# Patient Record
Sex: FEMALE | Race: OTHER | HISPANIC OR LATINO | Employment: FULL TIME | URBAN - METROPOLITAN AREA
[De-identification: names, ages, dates, MRNs, and addresses within clinical notes are randomized per-mention and may not be internally consistent; named-entity substitution may affect disease eponyms.]

---

## 2021-07-13 RX ORDER — AMITRIPTYLINE HYDROCHLORIDE 50 MG/1
50 TABLET, FILM COATED ORAL
COMMUNITY

## 2021-07-13 RX ORDER — KRILL/OM-3/DHA/EPA/PHOSPHO/AST 500-110 MG
CAPSULE ORAL DAILY
COMMUNITY

## 2021-07-13 RX ORDER — SUMATRIPTAN 100 MG/1
100 TABLET, FILM COATED ORAL ONCE AS NEEDED
COMMUNITY

## 2021-07-13 RX ORDER — ESTRADIOL 0.1 MG/G
CREAM VAGINAL DAILY
COMMUNITY

## 2021-07-13 RX ORDER — VALACYCLOVIR HYDROCHLORIDE 500 MG/1
500 TABLET, FILM COATED ORAL 2 TIMES DAILY
COMMUNITY

## 2021-07-13 NOTE — PRE-PROCEDURE INSTRUCTIONS
Pre-Surgery Instructions:   Medication Instructions    amitriptyline (ELAVIL) 50 mg tablet Continue to take this medication on your normal schedule  If this is an oral medication and you take it in the morning, then you may take this medicine with a sip of water   Ascorbic Acid (VITAMIN C PO) stop taking 7 days prior to surgery   butalbital-acetaminophen-caffeine (FIORICET) -40 mg per tablet Continue to take this medication on your normal schedule  If this is an oral medication and you take it in the morning, then you may take this medicine with a sip of water   estradiol (ESTRACE) 0 1 mg/g vaginal cream Continue as ordered, do not apply DOS   Krill Oil (Omega-3) 500 MG CAPS stop taking 7 days prior to surgery   levothyroxine 50 mcg tablet Continue to take this medication on your normal schedule  If this is an oral medication and you take it in the morning, then you may take this medicine with a sip of water   Misc Natural Products (Turmeric Curcumin) CAPS stop taking 7 days prior to surgery   SUMAtriptan (IMITREX) 100 mg tablet Continue to take this medication on your normal schedule  If this is an oral medication and you take it in the morning, then you may take this medicine with a sip of water   valACYclovir (VALTREX) 500 mg tablet Continue to take this medication on your normal schedule  If this is an oral medication and you take it in the morning, then you may take this medicine with a sip of water  Covid screening negative as per patient  Reviewed with patient via phone all medication instructions  Advised not to take any NSAID's, Vitamins or Herbal products for 7 days prior to the DOS  Acetaminophen products are ok to take  Reviewed showering instructions as given by surgical office  Instructed about NPO after midnight the night before DOS, except sips of water with allowed medications in AM on DOS   Informed about call from Adithya Owens 26 with the time to arrive for the scheduled surgery  Patient verbalized understanding

## 2021-07-16 ENCOUNTER — ANESTHESIA EVENT (OUTPATIENT)
Dept: PERIOP | Facility: HOSPITAL | Age: 51
End: 2021-07-16
Payer: COMMERCIAL

## 2021-07-20 RX ORDER — CEFAZOLIN SODIUM 2 G/50ML
2000 SOLUTION INTRAVENOUS ONCE
Status: DISCONTINUED | OUTPATIENT
Start: 2021-07-22 | End: 2021-07-22

## 2021-07-20 RX ORDER — SODIUM CHLORIDE, SODIUM LACTATE, POTASSIUM CHLORIDE, CALCIUM CHLORIDE 600; 310; 30; 20 MG/100ML; MG/100ML; MG/100ML; MG/100ML
125 INJECTION, SOLUTION INTRAVENOUS CONTINUOUS
Status: DISCONTINUED | OUTPATIENT
Start: 2021-07-22 | End: 2021-07-22 | Stop reason: HOSPADM

## 2021-07-20 RX ORDER — PHENAZOPYRIDINE HYDROCHLORIDE 200 MG/1
200 TABLET, FILM COATED ORAL ONCE
Status: DISCONTINUED | OUTPATIENT
Start: 2021-07-22 | End: 2021-07-22

## 2021-07-22 ENCOUNTER — HOSPITAL ENCOUNTER (OUTPATIENT)
Facility: HOSPITAL | Age: 51
Setting detail: OUTPATIENT SURGERY
Discharge: HOME | End: 2021-07-22
Attending: OBSTETRICS & GYNECOLOGY | Admitting: OBSTETRICS & GYNECOLOGY
Payer: COMMERCIAL

## 2021-07-22 ENCOUNTER — ANESTHESIA (OUTPATIENT)
Dept: PERIOP | Facility: HOSPITAL | Age: 51
End: 2021-07-22
Payer: COMMERCIAL

## 2021-07-22 VITALS
HEIGHT: 63 IN | RESPIRATION RATE: 16 BRPM | WEIGHT: 175 LBS | DIASTOLIC BLOOD PRESSURE: 67 MMHG | TEMPERATURE: 97.9 F | OXYGEN SATURATION: 97 % | SYSTOLIC BLOOD PRESSURE: 125 MMHG | HEART RATE: 89 BPM | BODY MASS INDEX: 31.01 KG/M2

## 2021-07-22 DIAGNOSIS — N36.41 HYPERMOBILITY OF URETHRA: Primary | ICD-10-CM

## 2021-07-22 DIAGNOSIS — N39.3 STRESS INCONTINENCE (FEMALE) (MALE): ICD-10-CM

## 2021-07-22 PROBLEM — Z86.69 HX OF MIGRAINES: Status: ACTIVE | Noted: 2021-07-22

## 2021-07-22 LAB
EXT PREGNANCY TEST URINE: NEGATIVE
EXT. CONTROL: NORMAL

## 2021-07-22 PROCEDURE — C1771 REP DEV, URINARY, W/SLING: HCPCS | Performed by: OBSTETRICS & GYNECOLOGY

## 2021-07-22 PROCEDURE — 81025 URINE PREGNANCY TEST: CPT | Performed by: ANESTHESIOLOGY

## 2021-07-22 DEVICE — SINGLE INCISION SLING SYSTEM
Type: IMPLANTABLE DEVICE | Site: BLADDER | Status: FUNCTIONAL
Brand: ALTIS

## 2021-07-22 RX ORDER — SODIUM CHLORIDE 9 MG/ML
125 INJECTION, SOLUTION INTRAVENOUS CONTINUOUS
Status: DISCONTINUED | OUTPATIENT
Start: 2021-07-22 | End: 2021-07-22 | Stop reason: HOSPADM

## 2021-07-22 RX ORDER — FENTANYL CITRATE/PF 50 MCG/ML
25 SYRINGE (ML) INJECTION
Status: DISCONTINUED | OUTPATIENT
Start: 2021-07-22 | End: 2021-07-22 | Stop reason: HOSPADM

## 2021-07-22 RX ORDER — SENNOSIDES 8.6 MG
650 CAPSULE ORAL EVERY 8 HOURS PRN
Qty: 30 TABLET | Refills: 0
Start: 2021-07-22

## 2021-07-22 RX ORDER — PROPOFOL 10 MG/ML
INJECTION, EMULSION INTRAVENOUS AS NEEDED
Status: DISCONTINUED | OUTPATIENT
Start: 2021-07-22 | End: 2021-07-22

## 2021-07-22 RX ORDER — MIDAZOLAM HYDROCHLORIDE 2 MG/2ML
INJECTION, SOLUTION INTRAMUSCULAR; INTRAVENOUS AS NEEDED
Status: DISCONTINUED | OUTPATIENT
Start: 2021-07-22 | End: 2021-07-22

## 2021-07-22 RX ORDER — PHENAZOPYRIDINE HYDROCHLORIDE 200 MG/1
200 TABLET, FILM COATED ORAL ONCE
Status: COMPLETED | OUTPATIENT
Start: 2021-07-22 | End: 2021-07-22

## 2021-07-22 RX ORDER — ONDANSETRON 2 MG/ML
INJECTION INTRAMUSCULAR; INTRAVENOUS AS NEEDED
Status: DISCONTINUED | OUTPATIENT
Start: 2021-07-22 | End: 2021-07-22

## 2021-07-22 RX ORDER — IBUPROFEN 600 MG/1
600 TABLET ORAL EVERY 6 HOURS PRN
Status: DISCONTINUED | OUTPATIENT
Start: 2021-07-22 | End: 2021-07-22 | Stop reason: HOSPADM

## 2021-07-22 RX ORDER — IBUPROFEN 600 MG/1
600 TABLET ORAL EVERY 6 HOURS PRN
Qty: 30 TABLET | Refills: 0
Start: 2021-07-22

## 2021-07-22 RX ORDER — ACETAMINOPHEN 325 MG/1
650 TABLET ORAL EVERY 6 HOURS PRN
Status: DISCONTINUED | OUTPATIENT
Start: 2021-07-22 | End: 2021-07-22 | Stop reason: HOSPADM

## 2021-07-22 RX ORDER — ONDANSETRON 2 MG/ML
4 INJECTION INTRAMUSCULAR; INTRAVENOUS EVERY 6 HOURS PRN
Status: DISCONTINUED | OUTPATIENT
Start: 2021-07-22 | End: 2021-07-22 | Stop reason: HOSPADM

## 2021-07-22 RX ORDER — LIDOCAINE HYDROCHLORIDE 20 MG/ML
INJECTION, SOLUTION EPIDURAL; INFILTRATION; INTRACAUDAL; PERINEURAL AS NEEDED
Status: DISCONTINUED | OUTPATIENT
Start: 2021-07-22 | End: 2021-07-22

## 2021-07-22 RX ORDER — CEFAZOLIN SODIUM 2 G/50ML
2000 SOLUTION INTRAVENOUS ONCE
Status: COMPLETED | OUTPATIENT
Start: 2021-07-22 | End: 2021-07-22

## 2021-07-22 RX ORDER — PROPOFOL 10 MG/ML
INJECTION, EMULSION INTRAVENOUS CONTINUOUS PRN
Status: DISCONTINUED | OUTPATIENT
Start: 2021-07-22 | End: 2021-07-22

## 2021-07-22 RX ORDER — FENTANYL CITRATE 50 UG/ML
INJECTION, SOLUTION INTRAMUSCULAR; INTRAVENOUS AS NEEDED
Status: DISCONTINUED | OUTPATIENT
Start: 2021-07-22 | End: 2021-07-22

## 2021-07-22 RX ORDER — ALBUTEROL SULFATE 2.5 MG/3ML
2.5 SOLUTION RESPIRATORY (INHALATION) ONCE AS NEEDED
Status: DISCONTINUED | OUTPATIENT
Start: 2021-07-22 | End: 2021-07-22 | Stop reason: HOSPADM

## 2021-07-22 RX ADMIN — LIDOCAINE HYDROCHLORIDE 80 MG: 20 INJECTION, SOLUTION EPIDURAL; INFILTRATION; INTRACAUDAL; PERINEURAL at 11:33

## 2021-07-22 RX ADMIN — FENTANYL CITRATE 25 MCG: 50 INJECTION INTRAMUSCULAR; INTRAVENOUS at 11:42

## 2021-07-22 RX ADMIN — FENTANYL CITRATE 25 MCG: 50 INJECTION INTRAMUSCULAR; INTRAVENOUS at 12:18

## 2021-07-22 RX ADMIN — FENTANYL CITRATE 25 MCG: 50 INJECTION INTRAMUSCULAR; INTRAVENOUS at 11:46

## 2021-07-22 RX ADMIN — PROPOFOL 100 MCG/KG/MIN: 10 INJECTION, EMULSION INTRAVENOUS at 11:33

## 2021-07-22 RX ADMIN — SODIUM CHLORIDE 125 ML/HR: 0.9 INJECTION, SOLUTION INTRAVENOUS at 10:21

## 2021-07-22 RX ADMIN — ONDANSETRON 4 MG: 2 INJECTION INTRAMUSCULAR; INTRAVENOUS at 11:49

## 2021-07-22 RX ADMIN — PROPOFOL 40 MG: 10 INJECTION, EMULSION INTRAVENOUS at 11:33

## 2021-07-22 RX ADMIN — PHENAZOPYRIDINE 200 MG: 200 TABLET ORAL at 10:20

## 2021-07-22 RX ADMIN — CEFAZOLIN SODIUM 2000 MG: 2 SOLUTION INTRAVENOUS at 11:18

## 2021-07-22 RX ADMIN — MIDAZOLAM 2 MG: 1 INJECTION INTRAMUSCULAR; INTRAVENOUS at 11:30

## 2021-07-22 RX ADMIN — IBUPROFEN 600 MG: 600 TABLET, FILM COATED ORAL at 14:19

## 2021-07-22 RX ADMIN — FENTANYL CITRATE 25 MCG: 50 INJECTION INTRAMUSCULAR; INTRAVENOUS at 11:49

## 2021-07-22 NOTE — DISCHARGE INSTRUCTIONS
Urethral Sling Procedure   WHAT YOU NEED TO KNOW:   A bladder sling procedure is surgery to treat urinary incontinence in women  The sling acts as a hammock to keep your urethra in place and hold it closed when your bladder is full  You may have vaginal bleeding or discharge for up to a week after your surgery  Use sanitary pads  Do not use tampons  You may have some pelvic discomfort or trouble urinating  DISCHARGE INSTRUCTIONS:   Call 911 for any of the following:   · You have sudden trouble breathing  Seek care immediately if:   · Your bleeding gets worse  · You have yellow or foul smelling discharge from your vagina  · You cannot urinate, or you are urinating less than what is normal for you  · You feel confused  Contact your healthcare provider if:   · You have a fever  · You do not feel like you are able to empty your bladder completely when you urinate  · You feel the need to urinate very suddenly  · You have burning or stinging when you urinate  · You have blood in your urine  · Your skin is itchy, swollen, or you have a rash  · You have questions or concerns about your condition or care  Medicines:   · Prescription pain medicine  may be given  Ask your how to take this medicine safely  · Take your medicine as directed  Contact your healthcare provider if you think your medicine is not helping or if you have side effects  Tell him or her if you are allergic to any medicine  Keep a list of the medicines, vitamins, and herbs you take  Include the amounts, and when and why you take them  Bring the list or the pill bottles to follow-up visits  Carry your medicine list with you in case of an emergency  Self-catheterization:  You may need to put a catheter into your bladder after you urinate to empty any remaining urine  A catheter is a small rubber tube used to drain urine  Healthcare providers will teach you how to put the catheter in safely   This may be needed until you are completely emptying your bladder  Meyer catheter: You may have a Meyer catheter for a short period of time  The Meyer is a tube put into your bladder to drain urine into a bag  Keep the bag below your waist  This will prevent urine from flowing back into your bladder and causing an infection or other problems  Also, keep the tube free of kinks so the urine will drain properly  Do not pull on the catheter  This can cause pain and bleeding, and may cause the catheter to come out  Activity:  Do not lift heavy objects for 6 weeks after your procedure  Do not have intercourse for 4 to 6 weeks  Do not use a tampon for 4 weeks  Ask your healthcare provider when you can return to work or your usual activities  Do pelvic muscle exercises: These are also called Kegel exercises  These exercises help strengthen your pelvic muscles and help prevent urine leakage  Tighten the muscles of your pelvis and hold them tight for 5 seconds, then relax for 5 seconds  Gradually work up to tightening them for 10 seconds and relaxing for 10 seconds  Do this 3 times each day  Keep a record:  Keep a record of when you urinate and if you leak any urine  Write down what you were doing when you leaked urine, such as coughing or sneezing  Bring the log to your follow-up visits  Prevent constipation:  Drink liquids as directed  You may need to drink more water than usual to soften your bowel movements  Eat a variety of healthy foods, especially fruit and foods high in fiber  You may need to use an over-the-counter bowel movement softener  Follow up with your healthcare provider as directed: You may need a test to check how much urine remains in your bladder after you urinate  This will help show how the sling is working  Write down your questions so you remember to ask them during your visits    © 2017 [employer] Information is for End User's use only and may not be sold, redistributed or otherwise used for commercial purposes  All illustrations and images included in CareNotes® are the copyrighted property of A D A M , Inc  or [employer]  The above information is an  only  It is not intended as medical advice for individual conditions or treatments  Talk to your doctor, nurse or pharmacist before following any medical regimen to see if it is safe and effective for you

## 2021-07-22 NOTE — OP NOTE
OPERATIVE REPORT  PATIENT NAME: Lissy Chavez    :  1970  MRN: [id number]  Pt Location: [facility] OR ROOM 07    SURGERY DATE: 2021    Surgeon(s) and Role:     * Yamilka Gaines MD - Primary     * Elin Tinajero MD - Fellow Assisting    Preop Diagnosis:  Stress incontinence (female) (male) [N39 3]  Hypermobility of urethra [N36 41]    Post-Op Diagnosis Codes:     * Stress incontinence (female) (male) [N39 3]     * Hypermobility of urethra [N36 41]    Procedure:  1 - Pubovaginal sling (ALTIS)  2 - Cysto    Specimen(s):  * No specimens in log *    Estimated Blood Loss:   50 mL    Drains:  * No LDAs found *    Anesthesia Type:   IV Sedation with Anesthesia    Operative Findings:  Cystoscopy at the end of the procedure showed normal appearing urothelium with no suture, mesh, or other foreign body  Bilateral ureteral jets were seen  Complications:   None    Procedure and Technique:    The patient was taken to the operating room where MAC anesthesia was found to be adequate  She was positioned in lithotomy using Yellowfin stirrups  SCDs were placed on her legs for DVT prophylaxis and antibiotics were administered for surgical site infection prophylaxis  The vagina and perineum were sterilely prepped and the patient was sterilely draped  A time out was called, identifying the correct patient and procedure  Next, attention was turned to the single incision pubovaginal sling (using the ALTIS system)  A samayoa catheter was inserted into the bladder  Hydrodissection of the vaginal wall beneath the mid urethra and vaginal sulci was performed  A 2 cm midurethral incision was made and periurethral tunnels were created towards the obturator membranes at 2 and 10 o'clock  The sling trocar was inserted into the fixation stay of the mesh and the static end of the mesh was placed into the left periurethral tunnel with the bladder protected and the tip of the trocar against the  complex    The trocar was first pushed cephalad, then advanced laterally until a pop was appreciated, then the trocar was rotated 1/4 turn, then withdrawn, leaving the stay in place  This same procedure was repeated on the patient's right side with the adjustable end of the sling  Cystoscopy was performed  Brisk efflux was noted from both ureters  The urothelium appeared normal with no lesions, suture, sling, or other foreign body  The patient was asked to cough and the sling was then tightened until minimal urine was seen leaking from the urethra  The adjustment suture was cut  The vaginal incision was closed with 2-0 Vicryl suture  The Meyer catheter was reinserted  The patient tolerated the procedure well  All counts were correct x2  The patient was taken back to the recovery room in stable condition  A qualified resident was not available for the case  I spoke to the patient's family at the end of the procedure     I was present for the entire procedure    Patient Disposition:  PACU     SIGNATURE: Vega Akbar MD  DATE: July 22, 2021  TIME: 12:41 PM

## 2021-07-22 NOTE — ANESTHESIA PREPROCEDURE EVALUATION
Procedure:  PUBOVAGINAL SLING (N/A Vagina )  CYSTOSCOPY (N/A Bladder)    Relevant Problems   No relevant active problems        Physical Exam    Airway    Mallampati score: II  TM Distance: >3 FB  Neck ROM: full     Dental   No notable dental hx     Cardiovascular  Rhythm: regular, Rate: normal, Cardiovascular exam normal    Pulmonary  Pulmonary exam normal Breath sounds clear to auscultation,     Other Findings        Anesthesia Plan  ASA Score- 2     Anesthesia Type- IV sedation with anesthesia with ASA Monitors  Additional Monitors:   Airway Plan:           Plan Factors-    Existing labs reviewed  Patient summary reviewed  Patient is not a current smoker  Patient instructed to abstain from smoking on day of procedure  Patient did not smoke on day of surgery  Induction- intravenous  Postoperative Plan-     Informed Consent- Anesthetic plan and risks discussed with patient and spouse (Kavitha Luis)

## 2024-09-18 ENCOUNTER — HOSPITAL ENCOUNTER (EMERGENCY)
Facility: HOSPITAL | Age: 54
Discharge: HOME | End: 2024-09-18
Attending: STUDENT IN AN ORGANIZED HEALTH CARE EDUCATION/TRAINING PROGRAM
Payer: OTHER MISCELLANEOUS

## 2024-09-18 ENCOUNTER — APPOINTMENT (EMERGENCY)
Dept: CT IMAGING | Facility: HOSPITAL | Age: 54
End: 2024-09-18
Payer: OTHER MISCELLANEOUS

## 2024-09-18 VITALS
DIASTOLIC BLOOD PRESSURE: 71 MMHG | SYSTOLIC BLOOD PRESSURE: 160 MMHG | HEART RATE: 78 BPM | TEMPERATURE: 97.7 F | OXYGEN SATURATION: 99 % | RESPIRATION RATE: 20 BRPM

## 2024-09-18 DIAGNOSIS — M54.9 BACK PAIN: Primary | ICD-10-CM

## 2024-09-18 DIAGNOSIS — M54.2 NECK PAIN: ICD-10-CM

## 2024-09-18 DIAGNOSIS — R93.89 ABNORMAL CT SCAN: ICD-10-CM

## 2024-09-18 DIAGNOSIS — R07.9 CHEST PAIN: ICD-10-CM

## 2024-09-18 LAB
ALBUMIN SERPL BCG-MCNC: 4.2 G/DL (ref 3.5–5)
ALP SERPL-CCNC: 44 U/L (ref 34–104)
ALT SERPL W P-5'-P-CCNC: 11 U/L (ref 7–52)
ANION GAP SERPL CALCULATED.3IONS-SCNC: 7 MMOL/L (ref 4–13)
AST SERPL W P-5'-P-CCNC: 12 U/L (ref 13–39)
ATRIAL RATE: 65 BPM
BASOPHILS # BLD AUTO: 0.08 THOUSANDS/ΜL (ref 0–0.1)
BASOPHILS NFR BLD AUTO: 1 % (ref 0–1)
BILIRUB SERPL-MCNC: 1.12 MG/DL (ref 0.2–1)
BUN SERPL-MCNC: 17 MG/DL (ref 5–25)
CALCIUM SERPL-MCNC: 9 MG/DL (ref 8.4–10.2)
CARDIAC TROPONIN I PNL SERPL HS: <2 NG/L
CHLORIDE SERPL-SCNC: 110 MMOL/L (ref 96–108)
CO2 SERPL-SCNC: 24 MMOL/L (ref 21–32)
CREAT SERPL-MCNC: 0.77 MG/DL (ref 0.6–1.3)
EOSINOPHIL # BLD AUTO: 0.36 THOUSAND/ΜL (ref 0–0.61)
EOSINOPHIL NFR BLD AUTO: 4 % (ref 0–6)
ERYTHROCYTE [DISTWIDTH] IN BLOOD BY AUTOMATED COUNT: 13.9 % (ref 11.6–15.1)
GFR SERPL CREATININE-BSD FRML MDRD: 87 ML/MIN/1.73SQ M
GLUCOSE SERPL-MCNC: 91 MG/DL (ref 65–140)
HCT VFR BLD AUTO: 38.1 % (ref 34.8–46.1)
HGB BLD-MCNC: 12.7 G/DL (ref 11.5–15.4)
IMM GRANULOCYTES # BLD AUTO: 0.04 THOUSAND/UL (ref 0–0.2)
IMM GRANULOCYTES NFR BLD AUTO: 0 % (ref 0–2)
LYMPHOCYTES # BLD AUTO: 2.03 THOUSANDS/ΜL (ref 0.6–4.47)
LYMPHOCYTES NFR BLD AUTO: 21 % (ref 14–44)
MCH RBC QN AUTO: 28.9 PG (ref 26.8–34.3)
MCHC RBC AUTO-ENTMCNC: 33.3 G/DL (ref 31.4–37.4)
MCV RBC AUTO: 87 FL (ref 82–98)
MONOCYTES # BLD AUTO: 0.55 THOUSAND/ΜL (ref 0.17–1.22)
MONOCYTES NFR BLD AUTO: 6 % (ref 4–12)
NEUTROPHILS # BLD AUTO: 6.82 THOUSANDS/ΜL (ref 1.85–7.62)
NEUTS SEG NFR BLD AUTO: 68 % (ref 43–75)
NRBC BLD AUTO-RTO: 0 /100 WBCS
P AXIS: 47 DEGREES
PLATELET # BLD AUTO: 286 THOUSANDS/UL (ref 149–390)
PMV BLD AUTO: 10.4 FL (ref 8.9–12.7)
POTASSIUM SERPL-SCNC: 3.7 MMOL/L (ref 3.5–5.3)
PR INTERVAL: 170 MS
PROT SERPL-MCNC: 6.3 G/DL (ref 6.4–8.4)
QRS AXIS: 55 DEGREES
QRSD INTERVAL: 74 MS
QT INTERVAL: 440 MS
QTC INTERVAL: 457 MS
RBC # BLD AUTO: 4.39 MILLION/UL (ref 3.81–5.12)
SODIUM SERPL-SCNC: 141 MMOL/L (ref 135–147)
T WAVE AXIS: 58 DEGREES
VENTRICULAR RATE: 65 BPM
WBC # BLD AUTO: 9.88 THOUSAND/UL (ref 4.31–10.16)

## 2024-09-18 PROCEDURE — 74177 CT ABD & PELVIS W/CONTRAST: CPT

## 2024-09-18 PROCEDURE — 93005 ELECTROCARDIOGRAM TRACING: CPT

## 2024-09-18 PROCEDURE — 99284 EMERGENCY DEPT VISIT MOD MDM: CPT

## 2024-09-18 PROCEDURE — 36415 COLL VENOUS BLD VENIPUNCTURE: CPT | Performed by: STUDENT IN AN ORGANIZED HEALTH CARE EDUCATION/TRAINING PROGRAM

## 2024-09-18 PROCEDURE — 84484 ASSAY OF TROPONIN QUANT: CPT | Performed by: STUDENT IN AN ORGANIZED HEALTH CARE EDUCATION/TRAINING PROGRAM

## 2024-09-18 PROCEDURE — 93010 ELECTROCARDIOGRAM REPORT: CPT | Performed by: INTERNAL MEDICINE

## 2024-09-18 PROCEDURE — 80053 COMPREHEN METABOLIC PANEL: CPT | Performed by: STUDENT IN AN ORGANIZED HEALTH CARE EDUCATION/TRAINING PROGRAM

## 2024-09-18 PROCEDURE — 71260 CT THORAX DX C+: CPT

## 2024-09-18 PROCEDURE — 96374 THER/PROPH/DIAG INJ IV PUSH: CPT

## 2024-09-18 PROCEDURE — 99284 EMERGENCY DEPT VISIT MOD MDM: CPT | Performed by: STUDENT IN AN ORGANIZED HEALTH CARE EDUCATION/TRAINING PROGRAM

## 2024-09-18 PROCEDURE — 72125 CT NECK SPINE W/O DYE: CPT

## 2024-09-18 PROCEDURE — 85025 COMPLETE CBC W/AUTO DIFF WBC: CPT | Performed by: STUDENT IN AN ORGANIZED HEALTH CARE EDUCATION/TRAINING PROGRAM

## 2024-09-18 RX ORDER — OXYCODONE HYDROCHLORIDE 5 MG/1
5 TABLET ORAL ONCE
Status: COMPLETED | OUTPATIENT
Start: 2024-09-18 | End: 2024-09-18

## 2024-09-18 RX ORDER — CYCLOBENZAPRINE HCL 10 MG
10 TABLET ORAL 2 TIMES DAILY PRN
Qty: 20 TABLET | Refills: 0 | Status: SHIPPED | OUTPATIENT
Start: 2024-09-18

## 2024-09-18 RX ORDER — KETOROLAC TROMETHAMINE 30 MG/ML
30 INJECTION, SOLUTION INTRAMUSCULAR; INTRAVENOUS ONCE
Status: COMPLETED | OUTPATIENT
Start: 2024-09-18 | End: 2024-09-18

## 2024-09-18 RX ADMIN — KETOROLAC TROMETHAMINE 30 MG: 30 INJECTION, SOLUTION INTRAMUSCULAR at 15:26

## 2024-09-18 RX ADMIN — OXYCODONE HYDROCHLORIDE 5 MG: 5 TABLET ORAL at 16:39

## 2024-09-18 RX ADMIN — IOHEXOL 100 ML: 350 INJECTION, SOLUTION INTRAVENOUS at 15:18

## 2024-09-18 NOTE — DISCHARGE INSTRUCTIONS
Continues over-the-counter medication like Tylenol Motrin as needed for discomfort.  You were given a muscle relaxer you can take at home. You can also try heating pad.  You can place Lidoderm patches on your back or areas of discomfort.    Follow-up with primary care physician as needed.    Return for any new or worsening symptoms.

## 2024-09-18 NOTE — Clinical Note
Renee Terrazas was seen and treated in our emergency department on 9/18/2024.    No restrictions            Diagnosis:     Renee  may return to work on return date.    She may return on this date: 09/20/2024         If you have any questions or concerns, please don't hesitate to call.      Carla Champion, DO    ______________________________           _______________          _______________  Hospital Representative                              Date                                Time

## 2024-09-18 NOTE — ED PROVIDER NOTES
1. Back pain    2. Neck pain    3. Chest pain    4. Abnormal CT scan      ED Disposition       ED Disposition   Discharge    Condition   Stable    Date/Time   Wed Sep 18, 2024  4:27 PM    Comment   Renee Terrazas discharge to home/self care.                   Assessment & Plan       Medical Decision Making  Amount and/or Complexity of Data Reviewed  Labs: ordered.  Radiology: ordered.    Risk  Prescription drug management.      Differential muscle spasm, lumbar radiculopathy, compression fracture    Patient is a 53-year-old female present emerged department no acute respiratory distress and vital signs unremarkable.  Patient does not have any deficits on physical exam.  Patient is concerned for internal injury.  Discomfort is in both the thoracic and lumbar region.  Nothing is made it better at this point.  Came in for further management.  Lab work nonacute.  CT performed and incidental findings reported to patient at bedside.  Discussed symptom management as well as return follow-up precautions.  Disposition discharge    HEART Risk Score      Flowsheet Row Most Recent Value   Heart Score Risk Calculator    History 0 Filed at: 09/18/2024 1627   ECG 0 Filed at: 09/18/2024 1627   Age 1 Filed at: 09/18/2024 1627   Risk Factors 1 Filed at: 09/18/2024 1627   Troponin 0 Filed at: 09/18/2024 1627   HEART Score 2 Filed at: 09/18/2024 1627        EKG: rate 65 NSR without signs of acute ischemic change. No prior ekgs.            Medications   oxyCODONE (ROXICODONE) IR tablet 5 mg (5 mg Oral Given 9/18/24 1639)   iohexol (OMNIPAQUE) 350 MG/ML injection (MULTI-DOSE) 100 mL (100 mL Intravenous Given 9/18/24 1518)   ketorolac (TORADOL) injection 30 mg (30 mg Intravenous Given 9/18/24 1526)       History of Present Illness       HPI    Patient is a 53-year-old female present emerged department for a mechanical fall yesterday.  Patient says she fell down after walking up a set of stairs.  She landed on her backside.  She did not  strike her head or lose consciousness.  She is not on any blood thinning medications.  Patient has pain throughout her entire spine and into her neck.  Patient also reports substernal pain with radiation to her shoulders.  Thing seems to make her symptoms better or worse.  History includes high blood pressure and hypothyroidism.    Review of Systems   Constitutional:  Negative for chills and fever.   HENT:  Negative for ear pain and sore throat.    Eyes:  Negative for pain and visual disturbance.   Respiratory:  Negative for cough and shortness of breath.    Cardiovascular:  Positive for chest pain. Negative for palpitations.   Gastrointestinal:  Negative for abdominal pain and vomiting.   Genitourinary:  Negative for dysuria and hematuria.   Musculoskeletal:  Positive for back pain and neck pain. Negative for arthralgias.   Skin:  Negative for color change and rash.   Neurological:  Negative for seizures and syncope.   All other systems reviewed and are negative.          Objective     ED Triage Vitals [09/18/24 1342]   Temperature Pulse Blood Pressure Respirations SpO2 Patient Position - Orthostatic VS   97.7 °F (36.5 °C) 78 160/71 20 99 % Sitting      Temp src Heart Rate Source BP Location FiO2 (%) Pain Score    -- Monitor Left arm -- --        Physical Exam  Vitals and nursing note reviewed.   Constitutional:       General: She is not in acute distress.     Appearance: Normal appearance. She is well-developed.   HENT:      Head: Normocephalic and atraumatic.      Right Ear: Tympanic membrane normal.      Left Ear: Tympanic membrane normal.      Nose: Nose normal.      Mouth/Throat:      Mouth: Mucous membranes are moist.      Pharynx: Oropharynx is clear.   Eyes:      Extraocular Movements: Extraocular movements intact.      Conjunctiva/sclera: Conjunctivae normal.      Pupils: Pupils are equal, round, and reactive to light.   Cardiovascular:      Rate and Rhythm: Normal rate and regular rhythm.      Heart  sounds: No murmur heard.  Pulmonary:      Effort: Pulmonary effort is normal. No respiratory distress.      Breath sounds: Normal breath sounds.   Abdominal:      Palpations: Abdomen is soft.      Tenderness: There is no abdominal tenderness.   Musculoskeletal:         General: No swelling.      Cervical back: Neck supple.      Comments: Tenderness to thoracic and lumbar spine without signs of overt trauma   Skin:     General: Skin is warm and dry.      Capillary Refill: Capillary refill takes less than 2 seconds.   Neurological:      General: No focal deficit present.      Mental Status: She is alert and oriented to person, place, and time.      Comments: Cranial nerves II through XII intact.  Strength, sensation range of motion intact in bilateral upper and lower extremities.  Negative finger-nose-finger and heel-to-shin.     Psychiatric:         Mood and Affect: Mood normal.         Labs Reviewed   COMPREHENSIVE METABOLIC PANEL - Abnormal       Result Value    Sodium 141      Potassium 3.7      Chloride 110 (*)     CO2 24      ANION GAP 7      BUN 17      Creatinine 0.77      Glucose 91      Calcium 9.0      AST 12 (*)     ALT 11      Alkaline Phosphatase 44      Total Protein 6.3 (*)     Albumin 4.2      Total Bilirubin 1.12 (*)     eGFR 87      Narrative:     National Kidney Disease Foundation guidelines for Chronic Kidney Disease (CKD):     Stage 1 with normal or high GFR (GFR > 90 mL/min/1.73 square meters)    Stage 2 Mild CKD (GFR = 60-89 mL/min/1.73 square meters)    Stage 3A Moderate CKD (GFR = 45-59 mL/min/1.73 square meters)    Stage 3B Moderate CKD (GFR = 30-44 mL/min/1.73 square meters)    Stage 4 Severe CKD (GFR = 15-29 mL/min/1.73 square meters)    Stage 5 End Stage CKD (GFR <15 mL/min/1.73 square meters)  Note: GFR calculation is accurate only with a steady state creatinine   HS TROPONIN I 0HR - Normal    hs TnI 0hr <2     CBC AND DIFFERENTIAL    WBC 9.88      RBC 4.39      Hemoglobin 12.7       Hematocrit 38.1      MCV 87      MCH 28.9      MCHC 33.3      RDW 13.9      MPV 10.4      Platelets 286      nRBC 0      Segmented % 68      Immature Grans % 0      Lymphocytes % 21      Monocytes % 6      Eosinophils Relative 4      Basophils Relative 1      Absolute Neutrophils 6.82      Absolute Immature Grans 0.04      Absolute Lymphocytes 2.03      Absolute Monocytes 0.55      Eosinophils Absolute 0.36      Basophils Absolute 0.08       CT recon only thoracolumbar   Final Interpretation by Lupe Palm MD (09/18 1623)      No traumatic visceral injury noted involving the chest abdomen pelvis. No consolidation pleural effusion or pneumothorax identified.      Multiple hypodense/predominantly hyper enhancing lesions involving the liver, largest in the left hepatic lobe, but some of which may have associated peripheral discontinuous nodular rim enhancement, indicative of hemangiomas. However, these are    incompletely evaluated on this examination, and recommend further evaluation with liver protocol MRI without and with contrast for confirmation.      No bowel wall thickening or bowel obstruction. No intraperitoneal free air or ascites.      No fracture noted involving the thoracic or lumbar spine. Multilevel thoracic and lumbar spondylosis, as described above without high-grade central canal or neuroforaminal stenosis identified.         Workstation performed: [id number]         CT spine cervical without contrast   Final Interpretation by Lupe Palm MD (09/18 1607)      No cervical spine fracture identified. Prior ACDF at C5-C7.                  Workstation performed: [id number]         CT chest abdomen pelvis w contrast   Final Interpretation by Lupe Palm MD (09/18 1623)      No traumatic visceral injury noted involving the chest abdomen pelvis. No consolidation pleural effusion or pneumothorax identified.      Multiple hypodense/predominantly hyper enhancing lesions involving the liver, largest in the left  hepatic lobe, but some of which may have associated peripheral discontinuous nodular rim enhancement, indicative of hemangiomas. However, these are    incompletely evaluated on this examination, and recommend further evaluation with liver protocol MRI without and with contrast for confirmation.      No bowel wall thickening or bowel obstruction. No intraperitoneal free air or ascites.      No fracture noted involving the thoracic or lumbar spine. Multilevel thoracic and lumbar spondylosis, as described above without high-grade central canal or neuroforaminal stenosis identified.         Workstation performed: [id number]             Procedures       Carla Champion DO  09/20/24 2024

## 2025-06-16 ENCOUNTER — TELEPHONE (OUTPATIENT)
Age: 55
End: 2025-06-16

## 2025-06-16 NOTE — TELEPHONE ENCOUNTER
----- Message from Aiayna Howard DO sent at 9/16/2020  3:42 PM CDT -----  Can you please contact the patient's son Zac and let him know that biopsies showed esophagitis/inflammation. I recommend he take pantoprazole 40 mg once daily 30 minutes prior to breakfast. You can offer him a follow up visit next Friday afternoon 9/25 at 1:45 pm. Thanks      Pt calling for NP appt at [facility]    Advised situation with r/s and schedule being closed, offered [facility] next avail, she declined for now, will call back in a few months to see if [facility] schedule has reopened yet

## 2025-06-27 ENCOUNTER — HOSPITAL ENCOUNTER (EMERGENCY)
Facility: HOSPITAL | Age: 55
Discharge: HOME | End: 2025-06-27
Attending: EMERGENCY MEDICINE
Payer: COMMERCIAL

## 2025-06-27 VITALS
RESPIRATION RATE: 20 BRPM | WEIGHT: 154.32 LBS | HEART RATE: 79 BPM | SYSTOLIC BLOOD PRESSURE: 140 MMHG | DIASTOLIC BLOOD PRESSURE: 76 MMHG | TEMPERATURE: 97.9 F | BODY MASS INDEX: 27.34 KG/M2 | OXYGEN SATURATION: 100 %

## 2025-06-27 DIAGNOSIS — M54.12 CERVICAL RADICULOPATHY: Primary | ICD-10-CM

## 2025-06-27 PROCEDURE — 99284 EMERGENCY DEPT VISIT MOD MDM: CPT | Performed by: EMERGENCY MEDICINE

## 2025-06-27 PROCEDURE — 99282 EMERGENCY DEPT VISIT SF MDM: CPT

## 2025-06-27 RX ORDER — OXYCODONE AND ACETAMINOPHEN 5; 325 MG/1; MG/1
1 TABLET ORAL EVERY 6 HOURS PRN
Qty: 25 TABLET | Refills: 0 | Status: SHIPPED | OUTPATIENT
Start: 2025-06-27

## 2025-06-27 RX ORDER — METHOCARBAMOL 500 MG/1
500 TABLET, FILM COATED ORAL 4 TIMES DAILY PRN
Qty: 30 TABLET | Refills: 0 | Status: SHIPPED | OUTPATIENT
Start: 2025-06-27

## 2025-06-27 RX ORDER — PREDNISONE 20 MG/1
40 TABLET ORAL DAILY
Qty: 10 TABLET | Refills: 0 | Status: SHIPPED | OUTPATIENT
Start: 2025-06-27 | End: 2025-07-02

## 2025-06-27 RX ORDER — PREDNISONE 20 MG/1
60 TABLET ORAL ONCE
Status: COMPLETED | OUTPATIENT
Start: 2025-06-27 | End: 2025-06-27

## 2025-06-27 RX ADMIN — PREDNISONE 60 MG: 20 TABLET ORAL at 10:10

## 2025-06-27 NOTE — ED PROVIDER NOTES
Time reflects when diagnosis was documented in both MDM as applicable and the Disposition within this note       Time User Action Codes Description Comment    6/27/2025  9:55 AM Flip Rain Add [M54.12] Cervical radiculopathy           ED Disposition       ED Disposition   Discharge    Condition   Stable    Date/Time   Fri Jun 27, 2025  9:55 AM    Comment   Renee Terrazas discharge to home/self care.                   Assessment & Plan       Medical Decision Making  Risk  Prescription drug management.    Medical decision making 54-year-old female presents emergency department with right sided neck pain right-sided shoulder pain radiating down her right arm just with cervical radiculopathy.  We discussed analgesics we discussed steroids to reduce inflammation. We discussed follow-up with the [facility] we discussed indications to return.         Medications   predniSONE tablet 60 mg (60 mg Oral Given 6/27/25 1010)       ED Risk Strat Scores                    No data recorded                            History of Present Illness       Chief Complaint   Patient presents with    Shoulder Pain     Pt c/o right shoulder pain that radiates down her arm that has been worsening x 1 month        Past Medical History[1]   Past Surgical History[2]   Family History[3]   Social History[4]   E-Cigarette/Vaping      E-Cigarette/Vaping Substances      I have reviewed and agree with the history as documented.     HPI patient is a 54-year-old female she presents emergency department with right-sided neck pain radiates down the right arm to the fingers.  Patient reports pain has been present over the last month but now is progressively worsening.  She denies any trauma to the area.  She reports over the last month she has had increasing stinging and burning down the arm and reports now pain and numbness into the fingers.  She reports pain when she tries to bring the arm above the head that radiates down into the  fingertips.  She denies any focal weakness.  She reports good strength in her hand and movement in her elbow.  Past medical history apparently had some cervical spine disc disease, thyroid disease  Family hist noncontributory  Social history, non-smoker no history drug abuse    Review of Systems   Constitutional:  Negative for fever.   HENT:  Negative for congestion.    Eyes:  Negative for pain and redness.   Respiratory:  Negative for cough and shortness of breath.    Cardiovascular:  Negative for chest pain.   Gastrointestinal:  Negative for abdominal pain and vomiting.   Musculoskeletal:  Positive for neck pain.   Right shoulder pain        Objective       ED Triage Vitals [06/27/25 0942]   Temperature Pulse Blood Pressure Respirations SpO2 Patient Position - Orthostatic VS   97.9 °F (36.6 °C) 79 140/76 20 100 % Sitting      Temp Source Heart Rate Source BP Location FiO2 (%) Pain Score    Temporal Monitor Left arm -- --      Vitals      Date and Time Temp Pulse SpO2 Resp BP Pain Score FACES Pain Rating User   06/27/25 0942 97.9 °F (36.6 °C) 79 100 % 20 140/76 -- -- RJ            Physical Exam  Vitals and nursing note reviewed.   Constitutional:       Appearance: She is well-developed.   HENT:      Head: Normocephalic.      Right Ear: External ear normal.      Left Ear: External ear normal.      Nose: Nose normal.      Mouth/Throat:      Mouth: Mucous membranes are moist.      Pharynx: Oropharynx is clear.     Eyes:      General: Lids are normal.      Extraocular Movements: Extraocular movements intact.      Pupils: Pupils are equal, round, and reactive to light.     Neck:      Comments: There is right-sided cervical spine tenderness which radiates down the right arm she reports numbness solves her all of her fingers.  Cardiovascular:      Rate and Rhythm: Normal rate.   Pulmonary:      Effort: Pulmonary effort is normal. No respiratory distress.     Musculoskeletal:         General: No deformity.      Cervical  back: Normal range of motion and neck supple. Tenderness present.      Comments: Decreased range of motion right shoulder, pain is reproduced by trying to lift the shoulder behind the patient's head.  Remove the shoulder back.  Consistent with traction on her  cervical nerve roots.  Good pulses and distal sensation in the right arm.  Good capillary refill in all the fingers.  Normal sensation in all the fingers.,  Two point touch in all the fingers.     Skin:     General: Skin is warm and dry.     Neurological:      Mental Status: She is alert and oriented to person, place, and time.     Psychiatric:         Mood and Affect: Mood normal.         Results Reviewed       None            No orders to display       Procedures    ED Medication and Procedure Management   Prior to Admission Medications   Prescriptions Last Dose Informant Patient Reported? Taking?   Ascorbic Acid (VITAMIN C PO)   Yes No   Sig: Take 2,000 mg by mouth daily With 20 mg zinc and 20 mg Vitamin D3 with Vit B complex   Krill Oil (Omega-3) 500 MG CAPS   Yes No   Sig: Take by mouth daily   Misc Natural Products (Turmeric Curcumin) CAPS   Yes No   Sig: Take by mouth daily With ginger powder   SUMAtriptan (IMITREX) 100 mg tablet   Yes No   Sig: Take 100 mg by mouth once as needed for migraine   acetaminophen (TYLENOL) 650 mg CR tablet   No No   Sig: Take 1 tablet (650 mg total) by mouth every 8 (eight) hours as needed for mild pain   amitriptyline (ELAVIL) 50 mg tablet   Yes No   Sig: Take 50 mg by mouth daily at bedtime   butalbital-acetaminophen-caffeine (FIORICET) -40 mg per tablet   No No   Sig: Take 2 tablets by mouth every 6 (six) hours as needed for headaches or migraine   cyclobenzaprine (FLEXERIL) 10 mg tablet   No No   Sig: Take 1 tablet (10 mg total) by mouth 2 (two) times a day as needed for muscle spasms   estradiol (ESTRACE) 0.1 mg/g vaginal cream   Yes No   Sig: Insert into the vagina daily   ibuprofen (MOTRIN) 600 mg tablet   No  No   Sig: Take 1 tablet (600 mg total) by mouth every 6 (six) hours as needed for mild pain   levothyroxine 50 mcg tablet   Yes No   Sig: Take 50 mcg by mouth daily   valACYclovir (VALTREX) 500 mg tablet   Yes No   Sig: Take 500 mg by mouth 2 (two) times a day Not taking      Facility-Administered Medications: None     Discharge Medication List as of 6/27/2025  9:58 AM        START taking these medications    Details   methocarbamol (ROBAXIN) 500 mg tablet Take 1 tablet (500 mg total) by mouth 4 (four) times a day as needed for muscle spasms for up to 30 doses, Starting Fri 6/27/2025, Normal      oxyCODONE-acetaminophen (PERCOCET) 5-325 mg per tablet Take 1 tablet by mouth every 6 (six) hours as needed for moderate pain or severe pain for up to 25 doses Max Daily Amount: 4 tablets, Starting Fri 6/27/2025, Normal      predniSONE 20 mg tablet Take 2 tablets (40 mg total) by mouth daily for 5 days, Starting Fri 6/27/2025, Until Wed 7/2/2025, Normal           CONTINUE these medications which have NOT CHANGED    Details   acetaminophen (TYLENOL) 650 mg CR tablet Take 1 tablet (650 mg total) by mouth every 8 (eight) hours as needed for mild pain, Starting Thu 7/22/2021, No Print      amitriptyline (ELAVIL) 50 mg tablet Take 50 mg by mouth daily at bedtime, Historical Med      Ascorbic Acid (VITAMIN C PO) Take 2,000 mg by mouth daily With 20 mg zinc and 20 mg Vitamin D3 with Vit B complex, Historical Med      butalbital-acetaminophen-caffeine (FIORICET) -40 mg per tablet Take 2 tablets by mouth every 6 (six) hours as needed for headaches or migraine, Starting 11/15/2016, Until Discontinued, Print      cyclobenzaprine (FLEXERIL) 10 mg tablet Take 1 tablet (10 mg total) by mouth 2 (two) times a day as needed for muscle spasms, Starting Wed 9/18/2024, Normal      estradiol (ESTRACE) 0.1 mg/g vaginal cream Insert into the vagina daily, Historical Med      ibuprofen (MOTRIN) 600 mg tablet Take 1 tablet (600 mg total) by  mouth every 6 (six) hours as needed for mild pain, Starting Thu 7/22/2021, No Print      Krill Oil (Omega-3) 500 MG CAPS Take by mouth daily, Historical Med      levothyroxine 50 mcg tablet Take 50 mcg by mouth daily, Until Discontinued, Historical Med      Misc Natural Products (Turmeric Curcumin) CAPS Take by mouth daily With ginger powder, Historical Med      SUMAtriptan (IMITREX) 100 mg tablet Take 100 mg by mouth once as needed for migraine, Historical Med      valACYclovir (VALTREX) 500 mg tablet Take 500 mg by mouth 2 (two) times a day Not taking, Historical Med             ED SEPSIS DOCUMENTATION   Time reflects when diagnosis was documented in both MDM as applicable and the Disposition within this note       Time User Action Codes Description Comment    6/27/2025  9:55 AM Flip Rain Add [M54.12] Cervical radiculopathy                      [1]   Past Medical History:  Diagnosis Date    Disease of thyroid gland     History of skin cancer     upper back   [2]   Past Surgical History:  Procedure Laterality Date    CHOLECYSTECTOMY      MD CYSTOURETHROSCOPY N/A 7/22/2021    Procedure: CYSTOSCOPY;  Surgeon: Camron Jama MD;  Location: [facility];  Service: UroGynecology           MD SLING OPERATION STRESS INCONTINENCE N/A 7/22/2021    Procedure: PUBOVAGINAL SLING;  Surgeon: Camron Jama MD;  Location: [facility];  Service: UroGynecology          [3] No family history on file.  [4]   Social History  Tobacco Use    Smoking status: Never    Smokeless tobacco: Never   Substance Use Topics    Alcohol use: Never    Drug use: Never        Flip Rain MD  06/27/25 2531

## 2025-06-27 NOTE — DISCHARGE INSTRUCTIONS
Your Pain is consistent with a pinched nerve in your neck  Prednisone daily for next 5 days to reduce inflammation  Robaxin every 6 hours as needed for muscle spasm  Percocet 1 every 6 hours as needed for pain caution narcotic make you sleepy  Follow-up with the [facility], they will call you next business day.  You can call them today.

## 2025-06-30 ENCOUNTER — TELEPHONE (OUTPATIENT)
Dept: PHYSICAL THERAPY | Facility: OTHER | Age: 55
End: 2025-06-30

## 2025-06-30 ENCOUNTER — NURSE TRIAGE (OUTPATIENT)
Dept: PHYSICAL THERAPY | Facility: OTHER | Age: 55
End: 2025-06-30

## 2025-06-30 DIAGNOSIS — M54.12 CERVICAL RADICULOPATHY: ICD-10-CM

## 2025-06-30 DIAGNOSIS — G89.29 CHRONIC NECK PAIN: Primary | ICD-10-CM

## 2025-06-30 DIAGNOSIS — M54.2 CHRONIC NECK PAIN: Primary | ICD-10-CM

## 2025-06-30 NOTE — TELEPHONE ENCOUNTER
"Call placed to the patient per [facility] referral.    Unable to leave a message \"mailbox is full and cannot accept any messages at this time\".    This is the 1st attempt to reach the patient. Will defer referral per protocol.    Right sided neck pain radiating down the right shoulder/arm.  Numbness into the fingers.  "

## 2025-06-30 NOTE — TELEPHONE ENCOUNTER
Red Flag and Start Back documentation is currently located under Additional Charting.    [facility] was reviewed in detail and what we can provide for their neck and arm pain.  Patient is agreeable to being triaged and would like to proceed with Physical Therapy.    Referral was placed for Physical Therapy at the [place] site. Patients information was sent to the  to make evaluation appointment. Patient made aware that the PT office  will be calling to schedule the appointment.  Patient was provided with the phone number to the PT office.    No further questions and/or concerns were voiced by the patient at this time. Patient states understanding of the referral that was placed.    Referral Closed.

## 2025-06-30 NOTE — TELEPHONE ENCOUNTER
"Background - Initial Assessment  Clinical complaint: ED visit on Friday 06/27 due to Right Sided Neck Pain that began 1 month ago. Hx of cervical disc replacement apprx \"10 years ago\". Hx of back pain due to a fall at work about a year ago, was told she had a bulging disc (claim closed), pt has had PT for back pain. Patient states neck pain radiates down to the right shoulder, arm and into the fingers. Numbness into the fingers, she did feel some tingling sensation before but not anymore. Not seeing a cervical spine Doctor for this pain. Patient states pain is constant, worse when standing for too long, with movement, or when she tries to reach or /grab something. Patient wears a corrective back brace and one for her shoulder, she is also applying ice in the affected area. She is taking Methocarbamol and Oxycodone (not everyday). Patient described pain as sharp and spasm.  Date of onset: 1 month ago ( new flare up)  Frequency of pain: constant  Quality of pain: numb, spasm and sharp.    Protocols used: [facility] Protocol    "

## 2025-07-01 NOTE — PROGRESS NOTES
PT Evaluation     Today's date: 2025  Patient name: Renee Terrazas  : 1970  MRN: [id number]  Referring provider: Donna Orlando PA-C  Dx:   Encounter Diagnosis     ICD-10-CM    1. Cervical radiculopathy  M54.12 Ambulatory referral to PT spine      2. Chronic neck pain  M54.2 Ambulatory referral to PT spine    G89.29           Start Time: 1015  Stop Time: 1100  Total time in clinic (min): 45 minutes    Assessment  Impairments: activity intolerance, impaired physical strength, lacks appropriate home exercise program, pain with function and poor posture   Functional limitations: sleeping, gripping/lifting, ADLs  Symptom irritability: high    Assessment details: Pt is a 55 y/o female presenting to outpatient PT with a diagnosis of cervical radiculopathy. Upon examination, pt presents with forward head posture, decreased cervical mobility, normal strength, and (+) ULTT and Spurling's. Signs and symptoms consistent with diagnosis. Radicular symptoms present into RUE and were able to be centralized with repeated cervical retraction. Pt was provided HEP of repetitive motions and instructed to perform every 2-3 hours. She was educated on holding on program if symptoms worsen and reach out to therapist as needed. Functionally, these impairments have led to difficulty with sleeping, gripping/lifting, and performance of her usual ADLs and household tasks. Pt has good rehab potential to achieve stated goals and will benefit from skilled PT services to address aforementioned impairments in order to improve functional activity tolerance for ADLs. Pt will be seen for 30 days via [facility].   Understanding of Dx/Px/POC: good     Prognosis: good    Goals  STG - 4 weeks  1. Independent with HEP.  2. Pt will be able to sleep in preferred position without disturbance due to pain.    LTG - to be achieved by discharge  1. Pt able to perform all household tasks with minimal to no difficulty.  2. FOTO score  will improve by at least 10 points to demonstrate improved functional abilities.     Plan  Patient would benefit from: skilled physical therapy and PT eval  Planned modality interventions: cryotherapy, low level laser therapy and unattended electrical stimulation    Planned therapy interventions: IASTM, joint mobilization, kinesiology taping, manual therapy, nerve gliding, neuromuscular re-education, patient/caregiver education, strengthening, stretching, therapeutic activities, therapeutic exercise, flexibility, functional ROM exercises, graded activity, graded exercise, home exercise program and postural training    Frequency: 2x week  Plan of Care beginning date: 2025  Plan of Care expiration date: 2025  Treatment plan discussed with: patient        Subjective Evaluation    History of Present Illness  Date of onset: 2025  Mechanism of injury: Pt current complaint is neck pain and shooting pain down her right arm that started about a month ago. She has previous history of cervical surgery. She arrives in shoulder stabilizer because she gets shooting pain in the arm and does not want to move it. Also use posture corrector which she says helps. Ice has been helping but she is not getting much relief from those. She notes difficulty with sleeping, performing household activities, and gripping with R hand. Pt is right handed.   Patient Goals  Patient goals for therapy: decreased pain, increased motion, increased strength and independence with ADLs/IADLs    Pain  Current pain ratin  At best pain ratin  At worst pain ratin  Relieving factors: ice  Aggravating factors: sitting          Objective     Postural Observations  Seated posture: fair      Palpation     Right   Muscle spasm in the upper trapezius.   Tenderness of the levator scapulae, suboccipitals and upper trapezius.   Trigger point to levator scapulae and upper trapezius.     Neurological Testing     Sensation   Cervical/Thoracic    Left   Intact: light touch    Right   Intact: light touch    Reflexes   Left   Biceps (C5/C6): normal (2+)  Brachioradialis (C6): normal (2+)  Triceps (C7): normal (2+)    Right   Biceps (C5/C6): normal (2+)  Brachioradialis (C6): normal (2+)  Triceps (C7): normal (2+)    Active Range of Motion   Cervical/Thoracic Spine       Cervical    Flexion:  WFL  Extension:  WFL  Left rotation:  WFL  Right rotation:  WFL  Mechanical Assessment    Cervical    Seated retraction: repeated movements   Pain location: centralized  Pain intensity: better  Pain level: decreased    Thoracic      Lumbar      Strength/Myotome Testing   Cervical Spine     Left   Normal strength    Right   Normal strength    Additional Strength Details  Pain  with R abd     Tests   Cervical   Positive cervical distraction test.    Left   Positive Spurling's Test A.     Left Shoulder   Positive ULTT1.              Precautions: hx of cancer, hx of cervical disc replacement    POC expires Unit limit Auth Expiration date PT/OT + Visit Limit?   7/31 N/A N/A No auth 90 PC[facility] med review after 20th                 Visit/Unit Tracking  AUTH Status:  Date 7/2 90 Used 1               Remaining  89                   FOTO      Access Code - [email or web address]       Manuals 7/2            SOR CG            R UT release CG            Cervical distraction CG                         Neuro Re-Ed             Pt education on HEP, POC, IE findings, centralization 10'            Scap set HEP            R median NG x10                                                                Ther Ex             Seated cervical retraction x15            Seated cervical retraction w/ overpressure x15            Seated cervical retraction and extension x15            UT str B HEP                                                                Ther Activity                                       Gait Training                                       Modalities

## 2025-07-02 ENCOUNTER — EVALUATION (OUTPATIENT)
Dept: PHYSICAL THERAPY | Facility: CLINIC | Age: 55
End: 2025-07-02
Attending: PHYSICAL THERAPIST
Payer: COMMERCIAL

## 2025-07-02 DIAGNOSIS — M54.12 CERVICAL RADICULOPATHY: Primary | ICD-10-CM

## 2025-07-02 DIAGNOSIS — G89.29 CHRONIC NECK PAIN: ICD-10-CM

## 2025-07-02 DIAGNOSIS — M54.2 CHRONIC NECK PAIN: ICD-10-CM

## 2025-07-02 PROCEDURE — 97112 NEUROMUSCULAR REEDUCATION: CPT

## 2025-07-02 PROCEDURE — 97161 PT EVAL LOW COMPLEX 20 MIN: CPT

## 2025-07-02 PROCEDURE — 97110 THERAPEUTIC EXERCISES: CPT

## 2025-07-21 ENCOUNTER — APPOINTMENT (OUTPATIENT)
Dept: PHYSICAL THERAPY | Facility: CLINIC | Age: 55
End: 2025-07-21
Attending: PHYSICAL THERAPIST
Payer: COMMERCIAL

## 2025-07-23 ENCOUNTER — APPOINTMENT (OUTPATIENT)
Dept: PHYSICAL THERAPY | Facility: CLINIC | Age: 55
End: 2025-07-23
Attending: PHYSICAL THERAPIST
Payer: COMMERCIAL

## 2025-07-28 ENCOUNTER — APPOINTMENT (OUTPATIENT)
Dept: PHYSICAL THERAPY | Facility: CLINIC | Age: 55
End: 2025-07-28
Attending: PHYSICAL THERAPIST
Payer: COMMERCIAL

## 2025-07-30 ENCOUNTER — APPOINTMENT (OUTPATIENT)
Dept: PHYSICAL THERAPY | Facility: CLINIC | Age: 55
End: 2025-07-30
Attending: PHYSICAL THERAPIST
Payer: COMMERCIAL

## (undated) DEVICE — IV FLUSH NSS 10ML POSIFLUSH

## (undated) DEVICE — EXIDINE 4 PCT

## (undated) DEVICE — SCD SEQUENTIAL COMPRESSION COMFORT SLEEVE MEDIUM KNEE LENGTH: Brand: KENDALL SCD

## (undated) DEVICE — PREMIUM DRY TRAY LF: Brand: MEDLINE INDUSTRIES, INC.

## (undated) DEVICE — GLOVE INDICATOR PI UNDERGLOVE SZ 7.5 BLUE

## (undated) DEVICE — CATH FOLEY 18FR 5ML 2 WAY UNCOATED SILICONE

## (undated) DEVICE — STAYS ELASTIC 5MM SHARP HOOK LONE STAR

## (undated) DEVICE — UNDER BUTTOCKS DRAPE W/FLUID CONTROL POUCH: Brand: CONVERTORS

## (undated) DEVICE — ALLENTOWN DR  LUCENTE S LAP PK: Brand: CARDINAL HEALTH

## (undated) DEVICE — INTENDED FOR TISSUE SEPARATION, AND OTHER PROCEDURES THAT REQUIRE A SHARP SURGICAL BLADE TO PUNCTURE OR CUT.: Brand: BARD-PARKER SAFETY BLADES SIZE 11, STERILE

## (undated) DEVICE — RETRACTOR RING 14.1 X 14.1 CM DISP

## (undated) DEVICE — CYSTO TUBING SINGLE IRRIGATION

## (undated) DEVICE — NEEDLE HYPO 22G X 1-1/2 IN